# Patient Record
Sex: MALE | Race: BLACK OR AFRICAN AMERICAN | URBAN - METROPOLITAN AREA
[De-identification: names, ages, dates, MRNs, and addresses within clinical notes are randomized per-mention and may not be internally consistent; named-entity substitution may affect disease eponyms.]

---

## 2017-02-06 ENCOUNTER — OFFICE VISIT (OUTPATIENT)
Dept: FAMILY MEDICINE | Facility: CLINIC | Age: 27
End: 2017-02-06
Payer: MEDICAID

## 2017-02-06 VITALS
TEMPERATURE: 98.1 F | HEIGHT: 69 IN | BODY MASS INDEX: 26.45 KG/M2 | WEIGHT: 178.6 LBS | SYSTOLIC BLOOD PRESSURE: 117 MMHG | HEART RATE: 86 BPM | DIASTOLIC BLOOD PRESSURE: 69 MMHG | OXYGEN SATURATION: 98 %

## 2017-02-06 DIAGNOSIS — R51.9 CHRONIC DAILY HEADACHE: Primary | ICD-10-CM

## 2017-02-06 DIAGNOSIS — D69.3 CHRONIC IDIOPATHIC THROMBOCYTOPENIC PURPURA (H): ICD-10-CM

## 2017-02-06 DIAGNOSIS — M62.81 GENERALIZED MUSCLE WEAKNESS: ICD-10-CM

## 2017-02-06 PROCEDURE — 99203 OFFICE O/P NEW LOW 30 MIN: CPT | Performed by: FAMILY MEDICINE

## 2017-02-06 RX ORDER — SUMATRIPTAN 25 MG/1
TABLET, FILM COATED ORAL
Refills: 0 | COMMUNITY
Start: 2017-01-24

## 2017-02-06 RX ORDER — TRAMADOL HYDROCHLORIDE 50 MG/1
50 TABLET ORAL EVERY 8 HOURS PRN
Qty: 20 TABLET | Refills: 1 | Status: SHIPPED | OUTPATIENT
Start: 2017-02-06

## 2017-02-06 RX ORDER — CHOLECALCIFEROL (VITAMIN D3) 50 MCG
TABLET ORAL
Refills: 3 | COMMUNITY
Start: 2017-01-26

## 2017-02-06 RX ORDER — CYCLOBENZAPRINE HCL 10 MG
TABLET ORAL
Refills: 1 | COMMUNITY
Start: 2016-05-27 | End: 2017-02-06

## 2017-02-06 RX ORDER — FERROUS SULFATE 325(65) MG
TABLET ORAL
Refills: 3 | COMMUNITY
Start: 2017-01-20

## 2017-02-06 ASSESSMENT — PAIN SCALES - GENERAL: PAINLEVEL: NO PAIN (0)

## 2017-02-06 NOTE — NURSING NOTE
"Chief Complaint   Patient presents with     Headache     Abdominal Pain     Fatigue     Weakness       Initial /69 mmHg  Pulse 86  Temp(Src) 98.1  F (36.7  C) (Oral)  Ht 5' 9\" (1.753 m)  Wt 178 lb 9.6 oz (81.012 kg)  BMI 26.36 kg/m2  SpO2 98% Estimated body mass index is 26.36 kg/(m^2) as calculated from the following:    Height as of this encounter: 5' 9\" (1.753 m).    Weight as of this encounter: 178 lb 9.6 oz (81.012 kg).  Medication Reconciliation: complete     Robby Guzman CMA    "

## 2017-02-06 NOTE — MR AVS SNAPSHOT
After Visit Summary   2/6/2017    Alva Wesley    MRN: [id number]           Patient Information     Date Of Birth          1990        Visit Information        Provider Department      2/6/2017 10:00 AM Mine Cosme MD [facility]        Today's Diagnoses     Chronic daily headache    -  1     Chronic idiopathic thrombocytopenic purpura (H)         Generalized muscle weakness           Care Instructions      Rebound Headache     Overuse of pain medications can lead to rebound headaches.   You use pain medicines called analgesics to treat your headaches. You are now having more frequent or intense headaches (rebound headaches). They are your body s response to too much pain medicine. Prescription pain medicines can cause these headaches. But so can over-the-counter medicines like acetaminophen or ibuprofen. A drug that contains caffeine or butalbital is most likely to cause rebound headache.  Symptoms of rebound headache include:    Mild to moderate headache for 15 or more days each month for 3 months or more    Headache when you wake up that continues most of the day    Headaches getting worse over time    Need for more and more medicine to treat headaches  Rebound headaches are most often diagnosed by your symptoms and medicine history. You may need tests to rule out other causes of your headaches. In the emergency room, you may be given a non-analgesic pain medicine to treat the pain or stop future headaches.  Home care  Treatment involves stopping use of your pain medicines. Your healthcare provider can tell you how to safely do this. You may be able to stop right away. Or you may need to take less and less over time (taper off). This will depend on the medicines you have been taking. To do this, follow the schedule that your provider gives you. If you are taking pain medicines for other types of pain at the same time, your healthcare provider may need other  specialists to participate in your care.    For the first week or so after stopping, the headaches will likely get worse. You may also have withdrawal symptoms. These often include nausea, vomiting, and trouble sleeping. You may be given a medicine to help relieve pain and withdrawal symptoms. Take this exactly as you have been told. It is vital to avoid taking daily pain medicine. If you do so, rebound headaches will continue.    Caffeine can make rebound headaches worse. If you have caffeinated drinks every day, slowly cut your intake.    Keep a written log of your headaches. This can help you and your healthcare provider track your progress.    Be patient. It can take about 2 to 6 months to stop having rebound headaches.    Once you have broken the headache cycle, be careful not to start it again. Work with your provider to make a treatment plan for headache pain that has low risk of causing rebound headaches.    Relaxation can help lower tension and relieve pain. Try a massage, meditation, yoga, or other relaxation techniques. Or make time for a relaxing hobby that you enjoy.  Follow-up care  Follow up with your healthcare provider, or as advised.  When to seek medical advice  Call your healthcare provider right away if any of these occur:    Fever of 100.4 F (38 C) or higher    Headaches that wake you from sleep    Repeated vomiting or visual problems that don't go away    Headache with a stiff neck, rash, confusion, weakness, numbness, seizure (convulsion), or trouble talking    Headache that starts after a head injury or fall    A type of headache you have never had before    Headache that gets worse despite rest and medicine    4808-9034 [employer]. [address] [place] [address], [place] [address]. All rights reserved. This information is not intended as a substitute for professional medical care. Always follow your healthcare professional's instructions.         * HEADACHE [unspecified]    The cause  of your headache today is not clear, but it does not appear to be the sign of any serious illness.  Under stress, some people tense the muscles of their shoulder, neck and scalp without knowing it. If this condition lasts long enough, a TENSION HEADACHE can occur.  A MIGRAINE HEADACHE is caused by changes in blood flow to the brain. It can be mild or severe. A migraine attack may be triggered by emotional stress, hormone changes during the menstrual cycle, oral contraceptives, alcohol use, certain foods containing tyramine, eye strain, weather changes, missing meals, lack of sleep or oversleeping.  Other causes of headache include a viral illness, sinus, ear or throat infection, dental pain and TMJ (jaw joint) pain.  HOME CARE:    If you were given pain medicine for this headache, do not drive yourself home. Arrange for a ride, instead. When you get home, try to sleep. You should feel much better when you wake up.    If you are having nausea or vomiting, follow a light diet until your headache is relieved.    If you have a migraine type headache, use sunglasses when in the daylight or around bright indoor lighting until symptoms improve. Bright glaring light can worsen this kind of headache.  FOLLOW UP with your doctor if the headache is not better within the next 24 hours. If you have frequent headaches you should discuss a treatment plan with your primary care doctor. By being aware of the earliest signs of headache, and starting treatment right away, you may be able to stop the pain yourself.  GET PROMPT MEDICAL ATTENTION if any of the following occur:    Worsening of your head pain or no improvement within 24 hours    Repeated vomiting (unable to keep liquids down)    Fever over 101 F (38.3 C)    Stiff neck    Extreme drowsiness, confusion or fainting    Weakness of an arm or leg or one side of the face    Difficulty with speech or vision    0528-9581 [employer], [address] [facility], [place] [address].  All rights reserved. This information is not intended as a substitute for professional medical care. Always follow your healthcare professional's instructions.          Follow-ups after your visit        Additional Services     NEUROLOGY ADULT REFERRAL       Your provider has referred you to: [facility]: [facility] [phone]   [email or web address]  [facility]: [facility] [phone]   [email or web address]    Reason for Referral: Consult    Please be aware that coverage of these services is subject to the terms and limitations of your health insurance plan.  Call member services at your health plan with any benefit or coverage questions.      Please bring the following with you to your appointment:    (1) Any X-Rays, CTs or MRIs which have been performed.  Contact the facility where they were done to arrange for  prior to your scheduled appointment.    (2) List of current medications  (3) This referral request   (4) Any documents/labs given to you for this referral                  Who to contact     If you have questions or need follow up information about today's clinic visit or your schedule please contact [facility] directly at [phone].  Normal or non-critical lab and imaging results will be communicated to you by MyChart, letter or phone within 4 business days after the clinic has received the results. If you do not hear from us within 7 days, please contact the clinic through [facility] or phone. If you have a critical or abnormal lab result, we will notify you by phone as soon as possible.  Submit refill requests through [employer] or call your pharmacy and they will forward the refill request to us. Please allow 3 business days for your refill to be completed.          Additional Information About Your Visit        [employer] Information     [employer] lets you send messages to your  "doctor, view your test results, renew your prescriptions, schedule appointments and more. To sign up, go to [email or web address] . Click on \"Log in\" on the left side of the screen, which will take you to the Welcome page. Then click on \"Sign up Now\" on the right side of the page.     You will be asked to enter the access code listed below, as well as some personal information. Please follow the directions to create your username and password.     Your access code is: [email or web address]  Expires: 2017 11:07 AM     Your access code will  in 90 days. If you need help or a new code, please call your [facility] clinic or [phone].        Care EveryWhere ID     This is your Care EveryWhere ID. This could be used by other organizations to access your [facility] medical records  [id number]        Your Vitals Were     Pulse Temperature Height BMI (Body Mass Index) Pulse Oximetry       86 98.1  F (36.7  C) (Oral) 5' 9\" (1.753 m) 26.36 kg/m2 98%        Blood Pressure from Last 3 Encounters:   17 117/69    Weight from Last 3 Encounters:   17 178 lb 9.6 oz (81.012 kg)              We Performed the Following     NEUROLOGY ADULT REFERRAL          Today's Medication Changes          These changes are accurate as of: 17 11:07 AM.  If you have any questions, ask your nurse or doctor.               Start taking these medicines.        Dose/Directions    traMADol 50 MG tablet   Commonly known as:  ULTRAM   Used for:  Chronic daily headache   Started by:  Mine Cosme MD        Dose:  50 mg   Take 1 tablet (50 mg) by mouth every 8 hours as needed for pain maximum 2 tablet(s) per day   Quantity:  20 tablet   Refills:  1            Where to get your medicines      Some of these will need a paper prescription and others can be bought over the counter.  Ask your nurse if you have questions.     Bring a paper prescription for each of these medications    - traMADol 50 MG tablet             Primary Care " Provider    None       No address on file        Thank you!     Thank you for choosing [facility]  for your care. Our goal is always to provide you with excellent care. Hearing back from our patients is one way we can continue to improve our services. Please take a few minutes to complete the written survey that you may receive in the mail after your visit with us. Thank you!             Your Updated Medication List - Protect others around you: Learn how to safely use, store and throw away your medicines at www.disposemymeds.org.          This list is accurate as of: 2/6/17 11:07 AM.  Always use your most recent med list.                   Brand Name Dispense Instructions for use    ferrous sulfate 325 (65 FE) MG tablet    IRON     TK 1 T PO BID WITH MEALS.       levonorgestrel 20 MCG/24HR IUD    MIRENA     1 each by Intrauterine route       SUMAtriptan 25 MG tablet    IMITREX     TK ONE T PO PRF MIGRAINE. MAY REPEAT ONE T AFTER TWO HOURS PRN. MAX OF 8 TS D AND 9 DAYS PER MONTH       traMADol 50 MG tablet    ULTRAM    20 tablet    Take 1 tablet (50 mg) by mouth every 8 hours as needed for pain maximum 2 tablet(s) per day       vitamin D 2000 UNITS tablet      TK 1 T PO QD

## 2017-02-06 NOTE — PATIENT INSTRUCTIONS
Rebound Headache     Overuse of pain medications can lead to rebound headaches.   You use pain medicines called analgesics to treat your headaches. You are now having more frequent or intense headaches (rebound headaches). They are your body s response to too much pain medicine. Prescription pain medicines can cause these headaches. But so can over-the-counter medicines like acetaminophen or ibuprofen. A drug that contains caffeine or butalbital is most likely to cause rebound headache.  Symptoms of rebound headache include:    Mild to moderate headache for 15 or more days each month for 3 months or more    Headache when you wake up that continues most of the day    Headaches getting worse over time    Need for more and more medicine to treat headaches  Rebound headaches are most often diagnosed by your symptoms and medicine history. You may need tests to rule out other causes of your headaches. In the emergency room, you may be given a non-analgesic pain medicine to treat the pain or stop future headaches.  Home care  Treatment involves stopping use of your pain medicines. Your healthcare provider can tell you how to safely do this. You may be able to stop right away. Or you may need to take less and less over time (taper off). This will depend on the medicines you have been taking. To do this, follow the schedule that your provider gives you. If you are taking pain medicines for other types of pain at the same time, your healthcare provider may need other specialists to participate in your care.    For the first week or so after stopping, the headaches will likely get worse. You may also have withdrawal symptoms. These often include nausea, vomiting, and trouble sleeping. You may be given a medicine to help relieve pain and withdrawal symptoms. Take this exactly as you have been told. It is vital to avoid taking daily pain medicine. If you do so, rebound headaches will continue.    Caffeine can make rebound  headaches worse. If you have caffeinated drinks every day, slowly cut your intake.    Keep a written log of your headaches. This can help you and your healthcare provider track your progress.    Be patient. It can take about 2 to 6 months to stop having rebound headaches.    Once you have broken the headache cycle, be careful not to start it again. Work with your provider to make a treatment plan for headache pain that has low risk of causing rebound headaches.    Relaxation can help lower tension and relieve pain. Try a massage, meditation, yoga, or other relaxation techniques. Or make time for a relaxing hobby that you enjoy.  Follow-up care  Follow up with your healthcare provider, or as advised.  When to seek medical advice  Call your healthcare provider right away if any of these occur:    Fever of 100.4 F (38 C) or higher    Headaches that wake you from sleep    Repeated vomiting or visual problems that don't go away    Headache with a stiff neck, rash, confusion, weakness, numbness, seizure (convulsion), or trouble talking    Headache that starts after a head injury or fall    A type of headache you have never had before    Headache that gets worse despite rest and medicine    [id number] [employer]. [address] [place] [address]. All rights reserved. This information is not intended as a substitute for professional medical care. Always follow your healthcare professional's instructions.         * HEADACHE [unspecified]    The cause of your headache today is not clear, but it does not appear to be the sign of any serious illness.  Under stress, some people tense the muscles of their shoulder, neck and scalp without knowing it. If this condition lasts long enough, a TENSION HEADACHE can occur.  A MIGRAINE HEADACHE is caused by changes in blood flow to the brain. It can be mild or severe. A migraine attack may be triggered by emotional stress, hormone changes during the menstrual cycle,  oral contraceptives, alcohol use, certain foods containing tyramine, eye strain, weather changes, missing meals, lack of sleep or oversleeping.  Other causes of headache include a viral illness, sinus, ear or throat infection, dental pain and TMJ (jaw joint) pain.  HOME CARE:    If you were given pain medicine for this headache, do not drive yourself home. Arrange for a ride, instead. When you get home, try to sleep. You should feel much better when you wake up.    If you are having nausea or vomiting, follow a light diet until your headache is relieved.    If you have a migraine type headache, use sunglasses when in the daylight or around bright indoor lighting until symptoms improve. Bright glaring light can worsen this kind of headache.  FOLLOW UP with your doctor if the headache is not better within the next 24 hours. If you have frequent headaches you should discuss a treatment plan with your primary care doctor. By being aware of the earliest signs of headache, and starting treatment right away, you may be able to stop the pain yourself.  GET PROMPT MEDICAL ATTENTION if any of the following occur:    Worsening of your head pain or no improvement within 24 hours    Repeated vomiting (unable to keep liquids down)    Fever over 101 F (38.3 C)    Stiff neck    Extreme drowsiness, confusion or fainting    Weakness of an arm or leg or one side of the face    Difficulty with speech or vision    [id number] [facility], [address] [place] [address]. All rights reserved. This information is not intended as a substitute for professional medical care. Always follow your healthcare professional's instructions.

## 2017-02-06 NOTE — PROGRESS NOTES
SUBJECTIVE:                                                    Alva Wesley is a 26 year old male who presents to clinic today for the following health issues:    New patient    Headache and weakness, worse in the evening and at night.      Onset: xfew months     Description:   Location: unilateral in the bilateral temporal area and frontal  Character: squeezing and heavy  Frequency:  intermittent  Duration:  Usually all day    Intensity: moderate, worst pain 10/10    Progression of Symptoms:  same    Accompanying Signs & Symptoms:  Stiff neck: no   Neck or upper back pain: no   Fever: no   Sinus pressure: no   Nausea or vomiting: no   Dizziness: YES  Numbness: no   Weakness: YES, sometimes when sit to stand feels like going to fall. History of low platelets during pregnancy with daughter x3 years. Weakness bilateral worse when headache gets worse.   Visual changes: No   History:   Head trauma: no   Family history of migraines: no   Previous tests for headaches: YES- scan the head  Neurologist evaluations: no   Able to do daily activities: no   Wake with a headaches: YES sometimes  Do headaches wake you up: YES  Daily pain medication use: no OTC  Work/school stressors/changes: no     Precipitating factors:   Does light make it worse: no   Does sound make it worse: no     Alleviating factors:  Does sleep help: no          Therapies Tried and outcome: Recently completed trial of Sumatriptan, iron, and this did not help. No vision changes    EMERGENCY DEPARTMENT visit notes from 12-16-16  Impression and Plan:  26 y.o. female otherwise healthy presents with headache present for one week however is intermittent throughout the day, denies any other focal neurologic symptoms, fever, nausea, vomiting. Her examination is normal here. She does not appear to be in any acute distress. She is not photophobic. She has not evidence of pharyngitis or acute otitis media. Her laboratory studies as above are within normal limits  including electrolytes, pregnancy test. Her white count is normal. She is not anemic. She also complained of fatigue.     While here she is given Tylenol and Ibuprofen. She has not had anything for her headache symptoms today. I do not think head imaging is indicated at this time given the lack of any red flag symptoms, continuous constant headache, or other focal neurologic symptoms or nausea. I discussed with her that she should seek reevaluation if her symptoms are not resolving over the next three days with use of Tylenol and Ibuprofen. Also if she develops any other accompanied symptoms such as nausea, continuous constant headache.   I do not suspect meningitis given her lack of fever and duration of symptoms nor a mass given the intermittant nature.       MR Brain W/WO IV Cont (01/04/2017 5:27 PM)  MR Brain W/WO IV Cont (01/04/2017 5:27 PM)   Impressions   IMPRESSION:      1. Normal-appearing brain.    2. Minimal mucosal thickening in the anterior ethmoid air cells.    3. Normal variant bilateral kateryna bullosa.       Complete Blood Count-W/Diff - Final result (12/16/2016 7:26 PM)  Complete Blood Count-W/Diff - Final result (12/16/2016 7:26 PM)   Component Value Range   White Blood Cell Count 6.2 3.8-11.0 k/cmm   Red Blood Cell Count 4.13 3.70-5.20 m/cmm   Hemoglobin 12.1 11.8-15.5 g/dL   Hematocrit 35.8 35.0-46.0 %   Mean Corpuscular Volume 86.7 80.0-100.0 fL   RDW 12.8 11.0-15.0 %   Platelet Count 52 (L) 140-450 k/cmm               Problem list and histories reviewed & adjusted, as indicated.  Additional history: as documented    Patient Active Problem List   Diagnosis     Acquired hemolytic anemia (H)     Chronic idiopathic thrombocytopenic purpura (H)     Immunocompromised due to corticosteroids (H)     Iron deficiency anemia     Systemic inflammatory response syndrome (sirs) of non-infectious origin without acute organ dysfunction (H)     Generalized muscle weakness     History reviewed. No pertinent past  "surgical history.    Social History   Substance Use Topics     Smoking status: Never Smoker      Smokeless tobacco: Not on file     Alcohol Use: No     Family History   Problem Relation Age of Onset     Family history unknown: Yes         Current Outpatient Prescriptions   Medication Sig Dispense Refill     Cholecalciferol (VITAMIN D) 2000 UNITS tablet TK 1 T PO QD  3     ferrous sulfate (IRON) 325 (65 FE) MG tablet TK 1 T PO BID WITH MEALS.  3     SUMAtriptan (IMITREX) 25 MG tablet TK ONE T PO PRF MIGRAINE. MAY REPEAT ONE T AFTER TWO HOURS PRN. MAX OF 8 TS D AND 9 DAYS PER MONTH  0     levonorgestrel (MIRENA) 20 MCG/24HR IUD 1 each by Intrauterine route       traMADol (ULTRAM) 50 MG tablet Take 1 tablet (50 mg) by mouth every 8 hours as needed for pain maximum 2 tablet(s) per day 20 tablet 1     Allergies   Allergen Reactions     Chloroquine      PN: LW Reaction: itching     No lab results found.   BP Readings from Last 3 Encounters:   02/06/17 117/69    Wt Readings from Last 3 Encounters:   02/06/17 178 lb 9.6 oz (81.012 kg)                  Labs reviewed in [facility]  Problem list, Medication list, Allergies, and Medical/Social/Surgical histories reviewed in [facility] and updated as appropriate.    ROS:  Constitutional, HEENT, cardiovascular, pulmonary, gi and gu systems are negative, except as otherwise noted.    OBJECTIVE:                                                    /69 mmHg  Pulse 86  Temp(Src) 98.1  F (36.7  C) (Oral)  Ht 5' 9\" (1.753 m)  Wt 178 lb 9.6 oz (81.012 kg)  BMI 26.36 kg/m2  SpO2 98%  Body mass index is 26.36 kg/(m^2).  GENERAL: healthy, alert, well nourished, well hydrated, no distress  HENT: ear canals- normal; TMs- normal; Nose- normal; Mouth- no ulcers, no lesions, throat is clear with no erythema or exudate.   NECK: no tenderness, no adenopathy, no asymmetry, no masses, no stiffness; thyroid- normal to palpation  RESP: lungs clear to auscultation - no rales, no rhonchi, no wheezes  CV: " regular rates and rhythm, normal S1 S2, no S3 or S4 and no murmur, no click or rub -  ABDOMEN: soft, no tenderness, no  hepatosplenomegaly, no masses, normal bowel sounds  MS: extremities- no gross deformities noted, no edema  SKIN: no suspicious lesions, no rashes  NEURO: strength and tone- normal, sensory exam- grossly normal, mentation- intact, speech- normal, reflexes- symmetric  BACK: no CVA tenderness, no paralumbar tenderness  PSYCH: Alert and oriented times 3; speech- coherent , normal rate and volume; able to articulate logical thoughts, able to abstract reason, no tangential thoughts, no hallucinations or delusions, affect- normal     Diagnostic Test Results:  none      ASSESSMENT/PLAN:                                                              ICD-10-CM    1. Chronic daily headache R51 traMADol (ULTRAM) 50 MG tablet- as needed but try not to take daily to prevent rebound headaches.      NEUROLOGY ADULT REFERRAL   2. Chronic idiopathic thrombocytopenic purpura (H) D69.3 NEUROLOGY ADULT REFERRAL   3. Generalized muscle weakness M62.81 NEUROLOGY ADULT REFERRAL       CONSULTATION/REFERRAL to neurology because of combination headaches and neurologic symptoms with thrombocytopenia. Also sees her hematologist.   FUTURE APPOINTMENTS:       - Follow-up for annual visit or as needed  See Patient Instructions    Mine Cosme MD  [facility]